# Patient Record
Sex: FEMALE | URBAN - METROPOLITAN AREA
[De-identification: names, ages, dates, MRNs, and addresses within clinical notes are randomized per-mention and may not be internally consistent; named-entity substitution may affect disease eponyms.]

---

## 2023-07-10 ENCOUNTER — APPOINTMENT (OUTPATIENT)
Dept: URBAN - METROPOLITAN AREA CLINIC 247 | Age: 23
Setting detail: DERMATOLOGY
End: 2023-07-10

## 2023-07-10 DIAGNOSIS — L91.0 HYPERTROPHIC SCAR: ICD-10-CM

## 2023-07-10 DIAGNOSIS — L83 ACANTHOSIS NIGRICANS: ICD-10-CM

## 2023-07-10 PROCEDURE — 99203 OFFICE O/P NEW LOW 30 MIN: CPT

## 2023-07-10 PROCEDURE — OTHER MIPS QUALITY: OTHER

## 2023-07-10 PROCEDURE — OTHER DEFER: OTHER

## 2023-07-10 PROCEDURE — OTHER COUNSELING: OTHER

## 2023-07-10 ASSESSMENT — LOCATION SIMPLE DESCRIPTION DERM
LOCATION SIMPLE: RIGHT ANTERIOR NECK
LOCATION SIMPLE: LEFT EAR

## 2023-07-10 ASSESSMENT — LOCATION ZONE DERM
LOCATION ZONE: NECK
LOCATION ZONE: EAR

## 2023-07-10 ASSESSMENT — LOCATION DETAILED DESCRIPTION DERM
LOCATION DETAILED: RIGHT SUPERIOR LATERAL NECK
LOCATION DETAILED: LEFT SUPERIOR POSTERIOR HELIX

## 2023-08-22 ENCOUNTER — APPOINTMENT (OUTPATIENT)
Dept: URBAN - METROPOLITAN AREA CLINIC 247 | Age: 23
Setting detail: DERMATOLOGY
End: 2023-08-22

## 2023-08-22 DIAGNOSIS — L91.0 HYPERTROPHIC SCAR: ICD-10-CM

## 2023-08-22 PROCEDURE — A4550 SURGICAL TRAYS: HCPCS

## 2023-08-22 PROCEDURE — OTHER EXCISION: OTHER

## 2023-08-22 PROCEDURE — 77261 THER RADIOLOGY TX PLNG SMPL: CPT

## 2023-08-22 PROCEDURE — 11443 EXC FACE-MM B9+MARG 2.1-3 CM: CPT

## 2023-08-22 PROCEDURE — OTHER SUPERFICIAL RADIOTHERAPY: OTHER

## 2023-08-22 PROCEDURE — 77401 RADIATION TX DELIVERY SUPFC: CPT

## 2023-08-22 PROCEDURE — OTHER SUPERFICIAL RADIOTHERAPY: SIMULATION VISIT: OTHER

## 2023-08-22 PROCEDURE — 77300 RADIATION THERAPY DOSE PLAN: CPT

## 2023-08-22 PROCEDURE — 77280 THER RAD SIMULAJ FIELD SMPL: CPT

## 2023-08-22 PROCEDURE — OTHER SUPERFICIAL RADIOTHERAPY: TREATMENT VISIT: OTHER

## 2023-08-22 PROCEDURE — 77333 RADIATION TREATMENT AID(S): CPT

## 2023-08-22 ASSESSMENT — LOCATION ZONE DERM
LOCATION ZONE: EAR
LOCATION ZONE: EAR

## 2023-08-22 ASSESSMENT — LOCATION SIMPLE DESCRIPTION DERM
LOCATION SIMPLE: LEFT EAR
LOCATION SIMPLE: LEFT EAR

## 2023-08-22 ASSESSMENT — LOCATION DETAILED DESCRIPTION DERM
LOCATION DETAILED: LEFT SUPERIOR POSTERIOR HELIX
LOCATION DETAILED: LEFT SUPERIOR POSTERIOR HELIX

## 2023-08-22 NOTE — PROCEDURE: SUPERFICIAL RADIOTHERAPY
Body Location Override (Optional): Left Superior Posterior Helix
Detail Level: Detailed
Field Number: 1
Lesion Dimensions-X Axis In Cm: 1.5
Lesion Dimensions-Y Axis In Cm: 1.9
Lesion Margin Size In Cm: 0.5
Shield Size In Cm: 3.0
Applicator Size In Cm: 4.0 cm
Energy (Kv): 100
Treatment Time (Min): 0.93
Time, Dose, Fractionation Factor (Tdf) For Prescription 1: 49
Daily Dose (Cgy): 601.71
Number Of Fractions For Prescription 1: 3
Total Dose For Prescription 1 (Cgy): 1805.13
Add A Second Prescription?: No
Additional Fraction(S) Needed:: 0
Bill For Physics Consultation: No - Render Text Only
Physics Documentation: (Physics Check Verbiage)

## 2023-08-22 NOTE — PROCEDURE: EXCISION

## 2023-08-22 NOTE — PROCEDURE: SUPERFICIAL RADIOTHERAPY: SIMULATION VISIT
Bill For Treatment Planning: Yes- (Simple Planning- 1 Site: [id number]) Bill For Treatment Planning: Yes- (Simple Planning- 1 Site: [id number])

## 2023-08-22 NOTE — PROCEDURE: SUPERFICIAL RADIOTHERAPY: SIMULATION VISIT
Simulation Documentation: This patient has been simulated and treated today with superficial Radiotherapy for keloid(s). The patient gave written consent to begin treatment today. The patient was treated with a specific radiation dose and setup as prescribed by the provider listed on this visit note. A Radiation Therapist performed administration of radiation under supervision of provider. The treatment parameters and cumulative dose are indicated above. Prior to administering the radiation, the patient underwent a verification therapeutic radiotherapy simulation-aided field setting defining relevant normal and abnormal target anatomy. This process includes verification of the treatment port(s) and proper treatment positioning. All treatment ports were photographed within patient’s medical record. The patient’s lead blocking along with treatment volume and margin was confirmed by provider. Considering superficial radiotherapy is clinical in setup, this requires physician and radiation therapist to clarify location interest being treated against initial images, shave or excision of the keloid(s) and patient anatomy. Care was taken ensuring fields treated were geometrically accurate and properly positioned using therapeutic radiotherapy simulation-aided field setting verification per fraction. This process is also utilized to determine if any prescription or setup changes are necessary. These steps are therefore medically necessary ensuring safe and effective administration of radiation. Ongoing therapeutic radiotherapy simulation-aided field setting verification is ordered throughout course of therapy.\\n\\nPer the request of Dr. Rose, continuing medical physics review as per radiotherapy standard of care, including assessment of treatment parameters,  of dose delivery, and review of patient treatment documentation in support of the provider, is ordered, ensuring efficacy and continued safe delivery of radiotherapy. Included in the physics check is a review of patient setup information, all pertinent simulation and treatment photograph(s) checks, prescription, dose calculation verification, per fraction dose charted correctly, elapsed days and treatment days correctly charted, cumulative dose correct, and review of any prescription changes.

## 2023-08-22 NOTE — PROCEDURE: SUPERFICIAL RADIOTHERAPY: TREATMENT VISIT
Energy (Kv): 100
Additional Change To Daily Dosage Administered Mid Treatment?: No
Daily Dosage (Cgy): 601.71
Bill For Treatment (08460)?: Yes
Calculate Total Cumulative Dose Automatically Or Manually: Manually
Prescription Used: 1

## 2023-08-22 NOTE — PROCEDURE: SUPERFICIAL RADIOTHERAPY: SIMULATION VISIT
Additional Comments (Add Customization Of Note Here): Patient instructed to apply Vaseline and bandage to the treatment excision site and to wash gently after 24 hours.

## 2023-08-23 ENCOUNTER — APPOINTMENT (OUTPATIENT)
Dept: URBAN - METROPOLITAN AREA CLINIC 247 | Age: 23
Setting detail: DERMATOLOGY
End: 2023-08-23

## 2023-08-23 DIAGNOSIS — L91.0 HYPERTROPHIC SCAR: ICD-10-CM

## 2023-08-23 PROCEDURE — 77401 RADIATION TX DELIVERY SUPFC: CPT

## 2023-08-23 PROCEDURE — 77280 THER RAD SIMULAJ FIELD SMPL: CPT

## 2023-08-23 PROCEDURE — OTHER SUPERFICIAL RADIOTHERAPY: OTHER

## 2023-08-23 PROCEDURE — OTHER SUPERFICIAL RADIOTHERAPY: TREATMENT VISIT: OTHER

## 2023-08-23 ASSESSMENT — LOCATION DETAILED DESCRIPTION DERM: LOCATION DETAILED: LEFT SUPERIOR POSTERIOR HELIX

## 2023-08-23 ASSESSMENT — LOCATION ZONE DERM: LOCATION ZONE: EAR

## 2023-08-23 ASSESSMENT — LOCATION SIMPLE DESCRIPTION DERM: LOCATION SIMPLE: LEFT EAR

## 2023-08-23 NOTE — PROCEDURE: SUPERFICIAL RADIOTHERAPY: TREATMENT VISIT
Energy (Kv): 100
Additional Change To Daily Dosage Administered Mid Treatment?: No
Treatment Documentation: This patient has been treated today with superficial Radiotherapy for keloids. Written informed consent has been previously obtained from this patient for this treatment. This consent is documented in the patient’s chart. The patient gave verbal consent to continue treatment today. The patient was treated with a specific radiation dose and setup as prescribed by the provider listed on this visit note. A Radiation Therapist performed administration of radiation under supervision of provider. The treatment parameters and cumulative dose are indicated above. Prior to administering the radiation, the patient underwent a verification therapeutic radiotherapy simulation-aided field setting defining relevant normal and abnormal target anatomy. The field placement simulation documents any change seen in overall tumor volume documented in the patient’s record, allows the clinician to indicate any needed changes in the treatment plan and/or prescription, provides diagnostic evaluation as the basis for performing the therapeutic procedure, and clearly identifies the information needed to decide to proceed with the therapeutic procedure. This process includes verification of the treatment port(s) and proper treatment positioning. All treatment ports were photographed within patient’s medical record. The patient’s lead blocking along with treatment volume and margin was confirmed. Considering superficial radiotherapy is clinical in setup, this requires physician and radiation therapist to clarify location interest being treated against initial images, shave or excision of the keloid(s) and patient anatomy. Care was taken ensuring fields treated were geometrically accurate and properly positioned using therapeutic radiotherapy simulation-aided field setting verification per fraction. This process is also utilized to determine if any prescription or setup changes are necessary. These steps are therefore medically necessary ensuring safe and effective administration of radiation. Ongoing therapeutic radiotherapy simulation-aided field setting verification is ordered throughout course of therapy.
Daily Dosage (Cgy): 601.71
Total Cumulative Dose (Cgy): 1203.42
Additional Comments (Add Customization Of Note Here): No patient complaints. No pain, but taking ibuprofen as a precaution.
Bill For Treatment (87842)?: Yes
Calculate Total Cumulative Dose Automatically Or Manually: Manually
Prescription Used: 1
Fraction Number: 2

## 2023-08-24 ENCOUNTER — APPOINTMENT (OUTPATIENT)
Dept: URBAN - METROPOLITAN AREA CLINIC 247 | Age: 23
Setting detail: DERMATOLOGY
End: 2023-08-24

## 2023-08-24 DIAGNOSIS — L91.0 HYPERTROPHIC SCAR: ICD-10-CM

## 2023-08-24 PROCEDURE — 77401 RADIATION TX DELIVERY SUPFC: CPT

## 2023-08-24 PROCEDURE — OTHER SUPERFICIAL RADIOTHERAPY: OTHER

## 2023-08-24 PROCEDURE — OTHER SUPERFICIAL RADIOTHERAPY: TREATMENT VISIT: OTHER

## 2023-08-24 PROCEDURE — 77280 THER RAD SIMULAJ FIELD SMPL: CPT

## 2023-08-24 ASSESSMENT — LOCATION SIMPLE DESCRIPTION DERM: LOCATION SIMPLE: LEFT EAR

## 2023-08-24 ASSESSMENT — LOCATION ZONE DERM: LOCATION ZONE: EAR

## 2023-08-24 ASSESSMENT — LOCATION DETAILED DESCRIPTION DERM: LOCATION DETAILED: LEFT SUPERIOR POSTERIOR HELIX

## 2023-08-24 NOTE — PROCEDURE: SUPERFICIAL RADIOTHERAPY: TREATMENT VISIT
Energy (Kv): 100
Additional Change To Daily Dosage Administered Mid Treatment?: No
Treatment Documentation: This patient has been treated today with superficial Radiotherapy for keloids. Written informed consent has been previously obtained from this patient for this treatment. This consent is documented in the patient’s chart. The patient gave verbal consent to continue treatment today. The patient was treated with a specific radiation dose and setup as prescribed by the provider listed on this visit note. A Radiation Therapist performed administration of radiation under supervision of provider. The treatment parameters and cumulative dose are indicated above. Prior to administering the radiation, the patient underwent a verification therapeutic radiotherapy simulation-aided field setting defining relevant normal and abnormal target anatomy. The field placement simulation documents any change seen in overall tumor volume documented in the patient’s record, allows the clinician to indicate any needed changes in the treatment plan and/or prescription, provides diagnostic evaluation as the basis for performing the therapeutic procedure, and clearly identifies the information needed to decide to proceed with the therapeutic procedure. This process includes verification of the treatment port(s) and proper treatment positioning. All treatment ports were photographed within patient’s medical record. The patient’s lead blocking along with treatment volume and margin was confirmed. Considering superficial radiotherapy is clinical in setup, this requires physician and radiation therapist to clarify location interest being treated against initial images, shave or excision of the keloid(s) and patient anatomy. Care was taken ensuring fields treated were geometrically accurate and properly positioned using therapeutic radiotherapy simulation-aided field setting verification per fraction. This process is also utilized to determine if any prescription or setup changes are necessary. These steps are therefore medically necessary ensuring safe and effective administration of radiation. Ongoing therapeutic radiotherapy simulation-aided field setting verification is ordered throughout course of therapy.
Daily Dosage (Cgy): 601.71
Total Cumulative Dose (Cgy): 1805.13
Additional Comments (Add Customization Of Note Here): No patient complaints. No pain, but taking ibuprofen as a precaution.
Bill For Treatment (12148)?: Yes
Calculate Total Cumulative Dose Automatically Or Manually: Manually
Prescription Used: 1
Fraction Number: 3

## 2023-09-02 ENCOUNTER — APPOINTMENT (OUTPATIENT)
Dept: URBAN - METROPOLITAN AREA CLINIC 247 | Age: 23
Setting detail: DERMATOLOGY
End: 2023-10-04

## 2023-09-02 DIAGNOSIS — L91.0 HYPERTROPHIC SCAR: ICD-10-CM

## 2023-09-02 PROCEDURE — OTHER SUPERFICIAL RADIOTHERAPY: OTHER

## 2023-09-02 PROCEDURE — 77336 RADIATION PHYSICS CONSULT: CPT

## 2023-09-02 ASSESSMENT — LOCATION ZONE DERM: LOCATION ZONE: EAR

## 2023-09-02 ASSESSMENT — LOCATION DETAILED DESCRIPTION DERM: LOCATION DETAILED: LEFT SUPERIOR POSTERIOR HELIX

## 2023-09-02 ASSESSMENT — LOCATION SIMPLE DESCRIPTION DERM: LOCATION SIMPLE: LEFT EAR

## 2023-09-02 NOTE — PROCEDURE: SUPERFICIAL RADIOTHERAPY
Body Location Override (Optional): Left Superior Posterior Helix
Detail Level: Detailed
Field Number: 1
Lesion Dimensions-X Axis In Cm: 1.5
Lesion Dimensions-Y Axis In Cm: 1.9
Lesion Margin Size In Cm: 0.5
Shield Size In Cm: 3.0
Applicator Size In Cm: 4.0 cm
Energy (Kv): 100
Treatment Time (Min): 0.93
Time, Dose, Fractionation Factor (Tdf) For Prescription 1: 49
Daily Dose (Cgy): 601.71
Number Of Fractions For Prescription 1: 3
Total Dose For Prescription 1 (Cgy): 1805.13
Add A Second Prescription?: No
Additional Fraction(S) Needed:: 0
Add Physics Consultation: Yes
Physics Consultation Performed For Fractions:: 1-3
Physics Documentation: Physician Orders: Plan: Medical Physics Checks:\\nPer the request of Dr. Mc, continuing medical physics review as per radiotherapy standard of care post every\\n5th fraction for patient, including assessment of treatment parameters,  of dose delivery,\\nand review of patient treatment documentation in support of the provider, to ensure efficacy and continued\\nsafe delivery of radiotherapy. Included in physics check is review of patient setup information, all pertinent\\nsimulation and treatment photographs checks, prescription, dose calculation verification, per fraction dose\\ncharted correctly, elapsed days and treatment days correctly charted, cumulative dose correct, and review of\\leeroy prescription changes. Patient was not present, nor was it necessary for the patient to be present for\\nweekly physics review and no other superficial radiotherapy services were rendered on this day. Continued\\nmedical physics review post every 5th fraction of therapy is requested by provider for appropriate\\nradiotherapy management and is deemed medically necessary and standard of care.

## 2023-09-19 ENCOUNTER — APPOINTMENT (OUTPATIENT)
Dept: URBAN - METROPOLITAN AREA CLINIC 247 | Age: 23
Setting detail: DERMATOLOGY
End: 2023-09-19

## 2023-09-19 DIAGNOSIS — Z48.817 ENCOUNTER FOR SURGICAL AFTERCARE FOLLOWING SURGERY ON THE SKIN AND SUBCUTANEOUS TISSUE: ICD-10-CM

## 2023-09-19 PROCEDURE — OTHER PHOTO-DOCUMENTATION: OTHER

## 2023-09-19 PROCEDURE — OTHER POST-OP WOUND CHECK: OTHER

## 2023-09-19 ASSESSMENT — LOCATION DETAILED DESCRIPTION DERM: LOCATION DETAILED: LEFT INFERIOR POSTERIOR HELIX

## 2023-09-19 ASSESSMENT — LOCATION ZONE DERM: LOCATION ZONE: EAR

## 2023-09-19 ASSESSMENT — LOCATION SIMPLE DESCRIPTION DERM: LOCATION SIMPLE: LEFT EAR

## 2023-09-19 NOTE — PROCEDURE: POST-OP WOUND CHECK
Detail Level: Detailed
Add 87232 Cpt? (Important Note: In 2017 The Use Of 76338 Is Being Tracked By Cms To Determine Future Global Period Reimbursement For Global Periods): no

## 2024-01-15 ENCOUNTER — APPOINTMENT (OUTPATIENT)
Dept: URBAN - METROPOLITAN AREA CLINIC 247 | Age: 24
Setting detail: DERMATOLOGY
End: 2024-01-16

## 2024-01-15 DIAGNOSIS — L91.0 HYPERTROPHIC SCAR: ICD-10-CM

## 2024-01-15 PROCEDURE — OTHER COUNSELING: OTHER

## 2024-01-15 PROCEDURE — OTHER MIPS QUALITY: OTHER

## 2024-01-15 PROCEDURE — OTHER OBSERVATION: OTHER

## 2024-01-15 PROCEDURE — 99212 OFFICE O/P EST SF 10 MIN: CPT

## 2024-01-15 ASSESSMENT — LOCATION DETAILED DESCRIPTION DERM
LOCATION DETAILED: LEFT SUPERIOR POSTERIOR HELIX
LOCATION DETAILED: LEFT POSTERIOR EAR

## 2024-01-15 ASSESSMENT — LOCATION ZONE DERM: LOCATION ZONE: EAR

## 2024-01-15 ASSESSMENT — LOCATION SIMPLE DESCRIPTION DERM: LOCATION SIMPLE: LEFT EAR
